# Patient Record
Sex: FEMALE | Race: WHITE | NOT HISPANIC OR LATINO | Employment: FULL TIME | URBAN - METROPOLITAN AREA
[De-identification: names, ages, dates, MRNs, and addresses within clinical notes are randomized per-mention and may not be internally consistent; named-entity substitution may affect disease eponyms.]

---

## 2019-04-26 ENCOUNTER — HOSPITAL ENCOUNTER (OUTPATIENT)
Dept: OTHER | Facility: HOSPITAL | Age: 44
Discharge: HOME | End: 2019-04-26

## 2021-02-25 ENCOUNTER — HOSPITAL ENCOUNTER (OUTPATIENT)
Dept: OTHER | Facility: HOSPITAL | Age: 46
Discharge: HOME | End: 2021-02-25

## 2023-01-19 ENCOUNTER — OFFICE VISIT (OUTPATIENT)
Dept: FAMILY MEDICINE CLINIC | Facility: CLINIC | Age: 48
End: 2023-01-19
Payer: COMMERCIAL

## 2023-01-19 VITALS
HEIGHT: 63 IN | OXYGEN SATURATION: 100 % | BODY MASS INDEX: 35.08 KG/M2 | HEART RATE: 99 BPM | DIASTOLIC BLOOD PRESSURE: 70 MMHG | WEIGHT: 198 LBS | SYSTOLIC BLOOD PRESSURE: 136 MMHG

## 2023-01-19 DIAGNOSIS — Z11.59 ENCOUNTER FOR HEPATITIS C SCREENING TEST FOR LOW RISK PATIENT: ICD-10-CM

## 2023-01-19 DIAGNOSIS — F33.1 MAJOR DEPRESSIVE DISORDER, RECURRENT, MODERATE: ICD-10-CM

## 2023-01-19 DIAGNOSIS — Z13.220 LIPID SCREENING: ICD-10-CM

## 2023-01-19 DIAGNOSIS — I10 ESSENTIAL HYPERTENSION: Primary | ICD-10-CM

## 2023-01-19 PROCEDURE — 99214 OFFICE O/P EST MOD 30 MIN: CPT | Performed by: PHYSICIAN ASSISTANT

## 2023-01-19 RX ORDER — DULOXETIN HYDROCHLORIDE 60 MG/1
60 CAPSULE, DELAYED RELEASE ORAL DAILY
Qty: 90 CAPSULE | Refills: 1 | Status: SHIPPED | OUTPATIENT
Start: 2023-01-19

## 2023-01-19 RX ORDER — OMEPRAZOLE 20 MG/1
20 CAPSULE, DELAYED RELEASE ORAL DAILY
COMMUNITY

## 2023-01-19 RX ORDER — LISINOPRIL 10 MG/1
10 TABLET ORAL DAILY
COMMUNITY
End: 2023-01-19 | Stop reason: SDUPTHER

## 2023-01-19 RX ORDER — CYCLOBENZAPRINE HCL 5 MG
5 TABLET ORAL 3 TIMES DAILY PRN
COMMUNITY

## 2023-01-19 RX ORDER — LISINOPRIL 10 MG/1
10 TABLET ORAL DAILY
Qty: 90 TABLET | Refills: 1 | Status: SHIPPED | OUTPATIENT
Start: 2023-01-19 | End: 2023-01-25 | Stop reason: DRUGHIGH

## 2023-01-19 RX ORDER — DULOXETIN HYDROCHLORIDE 60 MG/1
60 CAPSULE, DELAYED RELEASE ORAL DAILY
COMMUNITY
End: 2023-01-19 | Stop reason: SDUPTHER

## 2023-01-19 RX ORDER — CELECOXIB 200 MG/1
200 CAPSULE ORAL AS NEEDED
COMMUNITY

## 2023-01-19 RX ORDER — MELATONIN
1000 DAILY
COMMUNITY

## 2023-01-20 ENCOUNTER — CLINICAL SUPPORT (OUTPATIENT)
Dept: FAMILY MEDICINE CLINIC | Facility: CLINIC | Age: 48
End: 2023-01-20

## 2023-01-20 VITALS — DIASTOLIC BLOOD PRESSURE: 96 MMHG | SYSTOLIC BLOOD PRESSURE: 130 MMHG

## 2023-01-20 LAB
ALBUMIN SERPL-MCNC: 4.5 G/DL (ref 3.8–4.8)
ALBUMIN/GLOB SERPL: 1.9 {RATIO} (ref 1.2–2.2)
ALP SERPL-CCNC: 105 IU/L (ref 44–121)
ALT SERPL-CCNC: 19 IU/L (ref 0–32)
AST SERPL-CCNC: 18 IU/L (ref 0–40)
BASOPHILS # BLD AUTO: 0.1 X10E3/UL (ref 0–0.2)
BASOPHILS NFR BLD AUTO: 1 %
BILIRUB SERPL-MCNC: 0.2 MG/DL (ref 0–1.2)
BUN SERPL-MCNC: 12 MG/DL (ref 6–24)
BUN/CREAT SERPL: 16 (ref 9–23)
CALCIUM SERPL-MCNC: 9.7 MG/DL (ref 8.7–10.2)
CHLORIDE SERPL-SCNC: 101 MMOL/L (ref 96–106)
CHOLEST SERPL-MCNC: 179 MG/DL (ref 100–199)
CO2 SERPL-SCNC: 24 MMOL/L (ref 20–29)
CREAT SERPL-MCNC: 0.74 MG/DL (ref 0.57–1)
EGFRCR SERPLBLD CKD-EPI 2021: 100 ML/MIN/1.73
EOSINOPHIL # BLD AUTO: 0.2 X10E3/UL (ref 0–0.4)
EOSINOPHIL NFR BLD AUTO: 2 %
ERYTHROCYTE [DISTWIDTH] IN BLOOD BY AUTOMATED COUNT: 12.8 % (ref 11.7–15.4)
GLOBULIN SER CALC-MCNC: 2.4 G/DL (ref 1.5–4.5)
GLUCOSE SERPL-MCNC: 78 MG/DL (ref 70–99)
HCT VFR BLD AUTO: 42.4 % (ref 34–46.6)
HCV AB S/CO SERPL IA: <0.1 S/CO RATIO (ref 0–0.9)
HDLC SERPL-MCNC: 31 MG/DL
HGB BLD-MCNC: 13.6 G/DL (ref 11.1–15.9)
IMM GRANULOCYTES # BLD AUTO: 0 X10E3/UL (ref 0–0.1)
IMM GRANULOCYTES NFR BLD AUTO: 0 %
LDLC SERPL CALC-MCNC: 109 MG/DL (ref 0–99)
LYMPHOCYTES # BLD AUTO: 1.1 X10E3/UL (ref 0.7–3.1)
LYMPHOCYTES NFR BLD AUTO: 15 %
MCH RBC QN AUTO: 28.3 PG (ref 26.6–33)
MCHC RBC AUTO-ENTMCNC: 32.1 G/DL (ref 31.5–35.7)
MCV RBC AUTO: 88 FL (ref 79–97)
MONOCYTES # BLD AUTO: 0.6 X10E3/UL (ref 0.1–0.9)
MONOCYTES NFR BLD AUTO: 8 %
NEUTROPHILS # BLD AUTO: 5.5 X10E3/UL (ref 1.4–7)
NEUTROPHILS NFR BLD AUTO: 74 %
PLATELET # BLD AUTO: 364 X10E3/UL (ref 150–450)
POTASSIUM SERPL-SCNC: 4.8 MMOL/L (ref 3.5–5.2)
PROT SERPL-MCNC: 6.9 G/DL (ref 6–8.5)
RBC # BLD AUTO: 4.81 X10E6/UL (ref 3.77–5.28)
SODIUM SERPL-SCNC: 139 MMOL/L (ref 134–144)
TRIGL SERPL-MCNC: 223 MG/DL (ref 0–149)
TSH SERPL DL<=0.005 MIU/L-ACNC: 1.82 UIU/ML (ref 0.45–4.5)
VLDLC SERPL CALC-MCNC: 39 MG/DL (ref 5–40)
WBC # BLD AUTO: 7.4 X10E3/UL (ref 3.4–10.8)

## 2023-01-25 ENCOUNTER — TELEPHONE (OUTPATIENT)
Dept: FAMILY MEDICINE CLINIC | Facility: CLINIC | Age: 48
End: 2023-01-25
Payer: COMMERCIAL

## 2023-01-25 RX ORDER — LISINOPRIL 20 MG/1
20 TABLET ORAL DAILY
Qty: 90 TABLET | Refills: 1 | Status: SHIPPED | OUTPATIENT
Start: 2023-01-25

## 2023-01-25 NOTE — TELEPHONE ENCOUNTER
Called pt she is asking if you want to increase BP medication due to her BP being elevated came in Friday for BP check was 130/96 said its still been high.

## 2023-02-06 ENCOUNTER — TELEPHONE (OUTPATIENT)
Dept: FAMILY MEDICINE CLINIC | Facility: CLINIC | Age: 48
End: 2023-02-06
Payer: COMMERCIAL

## 2023-07-27 RX ORDER — LISINOPRIL 20 MG/1
TABLET ORAL
Qty: 30 TABLET | Refills: 0 | Status: SHIPPED | OUTPATIENT
Start: 2023-07-27

## 2023-08-22 ENCOUNTER — OFFICE VISIT (OUTPATIENT)
Dept: FAMILY MEDICINE CLINIC | Facility: CLINIC | Age: 48
End: 2023-08-22
Payer: COMMERCIAL

## 2023-08-22 VITALS
SYSTOLIC BLOOD PRESSURE: 126 MMHG | TEMPERATURE: 98.8 F | WEIGHT: 198 LBS | DIASTOLIC BLOOD PRESSURE: 80 MMHG | BODY MASS INDEX: 35.08 KG/M2 | HEIGHT: 63 IN | OXYGEN SATURATION: 99 % | HEART RATE: 96 BPM

## 2023-08-22 DIAGNOSIS — M54.2 CERVICALGIA: Primary | ICD-10-CM

## 2023-08-22 DIAGNOSIS — R51.9 CHRONIC NONINTRACTABLE HEADACHE, UNSPECIFIED HEADACHE TYPE: ICD-10-CM

## 2023-08-22 DIAGNOSIS — G89.29 CHRONIC NONINTRACTABLE HEADACHE, UNSPECIFIED HEADACHE TYPE: ICD-10-CM

## 2023-08-22 PROCEDURE — 99213 OFFICE O/P EST LOW 20 MIN: CPT | Performed by: PHYSICIAN ASSISTANT

## 2023-08-22 NOTE — PROGRESS NOTES
"Chief Complaint  Headache (Pt states has had a headache since Wednesday went to ER Friday. Pt states still having headaches )    Subjective          Vera Burch presents to [facility]  History of Present Illness  Patient in today for evaluation on headaches- states has been having more frequent past 6+ months- some seem like a typical migraine only lasting a day, but this most recent started 6 days ago and has stuck around. She went to ER and states had negative ct scan and labs- we have called to get records but not available during visit. She states pain seems to start at base of neck and up and over to right side of head. States does have some neck pain . Denies injury to neck. She denies any neuropathy symptoms. Denies visual changes. She is taking muscle relaxers as were felt could be tension type headache but states not really helping . She states is utd on eye exam.   Headache  Headache pattern:  Headache sometimes there, sometimes not at all  Providers seen:  None  Previous testing:  Blood work and CT  Number of ER visits for headache:  1  Did ER treatment alleviate headache symptoms?:  No    Objective   Vital Signs:   /80 (BP Location: Left arm, Patient Position: Sitting, Cuff Size: Large Adult)   Pulse 96   Temp 98.8 øF (37.1 øC)   Ht 160 cm (63\")   Wt 89.8 kg (198 lb)   SpO2 99%   BMI 35.07 kg/mý     Body mass index is 35.07 kg/mý.    Review of Systems   Constitutional:  Negative for fatigue.   HENT:  Negative for congestion and sore throat.    Respiratory:  Negative for cough and shortness of breath.    Cardiovascular:  Negative for chest pain.   Gastrointestinal:  Negative for abdominal pain, diarrhea, nausea and vomiting.   Musculoskeletal:  Positive for neck pain.   Neurological:  Positive for headache. Negative for dizziness.   Psychiatric/Behavioral:  Negative for depressed mood. The patient is not nervous/anxious.      Past History:  Medical History: " has a past medical history of Anxiety, Depressive disorder, and Hypertension.   Surgical History: has no past surgical history on file.   Family History: family history includes Other in her maternal grandmother.   Social History: reports that she has been smoking cigarettes. She has been smoking an average of .5 packs per day. She does not have any smokeless tobacco history on file. She reports that she does not drink alcohol and does not use drugs.      Current Outpatient Medications:     celecoxib (CeleBREX) 200 MG capsule, Take 1 capsule by mouth As Needed for Mild Pain., Disp: , Rfl:     cholecalciferol (VITAMIN D3) 25 MCG (1000 UT) tablet, Take 1 tablet by mouth Daily., Disp: , Rfl:     cyclobenzaprine (FLEXERIL) 5 MG tablet, Take 1 tablet by mouth 3 (Three) Times a Day As Needed for Muscle Spasms., Disp: , Rfl:     DULoxetine (CYMBALTA) 60 MG capsule, Take 1 capsule by mouth Daily., Disp: 90 capsule, Rfl: 1    lisinopril (PRINIVIL,ZESTRIL) 20 MG tablet, TAKE ONE TABLET BY MOUTH DAILY, Disp: 30 tablet, Rfl: 0    omeprazole (priLOSEC) 20 MG capsule, Take 1 capsule by mouth Daily., Disp: , Rfl:   Allergies: Patient has no known allergies.    Physical Exam  Constitutional:       Appearance: Normal appearance.   HENT:      Right Ear: Tympanic membrane normal.      Left Ear: Tympanic membrane normal.      Mouth/Throat:      Pharynx: Oropharynx is clear.   Eyes:      Conjunctiva/sclera: Conjunctivae normal.      Pupils: Pupils are equal, round, and reactive to light.   Cardiovascular:      Rate and Rhythm: Normal rate and regular rhythm.      Heart sounds: Normal heart sounds.   Pulmonary:      Effort: Pulmonary effort is normal.      Breath sounds: Normal breath sounds.   Abdominal:      Palpations: Abdomen is soft.      Tenderness: There is no abdominal tenderness.   Musculoskeletal:      Comments: Mild tenderness at base of neck to palpation; FROM of neck; mild tenderness to palpation bilateral trapezius muscles    Neurological:      Mental Status: She is oriented to person, place, and time.   Psychiatric:         Mood and Affect: Mood normal.         Behavior: Behavior normal.           Assessment and Plan   Diagnoses and all orders for this visit:    1. Cervicalgia (Primary)  -     XR Spine Cervical 2 or 3 View; Future  Will check xray of neck today for further evaluation; she may continue with muscle relaxers as needed- takes celebrex daily  2. Chronic nonintractable headache, unspecified headache type  She would like to get in for neurology consult with chronic headaches; rtc or to ER for any acute or worsening symptoms if needed           Follow Up   No follow-ups on file.  Patient was given instructions and counseling regarding her condition or for health maintenance advice. Please see specific information pulled into the AVS if appropriate.     Marisa Machado PA-C

## 2023-08-31 ENCOUNTER — OFFICE VISIT (OUTPATIENT)
Dept: NEUROLOGY | Facility: CLINIC | Age: 48
End: 2023-08-31
Payer: COMMERCIAL

## 2023-08-31 VITALS
HEART RATE: 96 BPM | SYSTOLIC BLOOD PRESSURE: 122 MMHG | DIASTOLIC BLOOD PRESSURE: 78 MMHG | WEIGHT: 194.6 LBS | BODY MASS INDEX: 34.48 KG/M2 | OXYGEN SATURATION: 97 % | HEIGHT: 63 IN

## 2023-08-31 DIAGNOSIS — G43.109 MIGRAINE WITH AURA AND WITHOUT STATUS MIGRAINOSUS, NOT INTRACTABLE: Primary | ICD-10-CM

## 2023-08-31 PROCEDURE — 99204 OFFICE O/P NEW MOD 45 MIN: CPT | Performed by: PSYCHIATRY & NEUROLOGY

## 2023-08-31 RX ORDER — NORTRIPTYLINE HYDROCHLORIDE 10 MG/1
20 CAPSULE ORAL NIGHTLY
Qty: 60 CAPSULE | Refills: 5 | Status: SHIPPED | OUTPATIENT
Start: 2023-08-31 | End: 2024-08-30

## 2023-08-31 RX ORDER — LISINOPRIL 20 MG/1
TABLET ORAL
Qty: 30 TABLET | Refills: 0 | Status: SHIPPED | OUTPATIENT
Start: 2023-08-31

## 2023-08-31 RX ORDER — DULOXETIN HYDROCHLORIDE 60 MG/1
CAPSULE, DELAYED RELEASE ORAL
Qty: 30 CAPSULE | Refills: 0 | Status: SHIPPED | OUTPATIENT
Start: 2023-08-31

## 2023-08-31 RX ORDER — RIZATRIPTAN BENZOATE 10 MG/1
TABLET ORAL
Qty: 8 TABLET | Refills: 3 | Status: SHIPPED | OUTPATIENT
Start: 2023-08-31

## 2023-08-31 NOTE — PROGRESS NOTES
Subjective:    CC: Vera Burch is seen today in consultation at the request of Marisa Machado PA-C for Headache (Started in June)       HPI:  48-year-old female accompanied by her  with a history of smoking, anxiety, depression, hypertension presents with headaches.  As per patient she started having headaches in June of this year.  The headaches were once every few weeks however earlier this month she had 11-day long headache on the right side of her head that would not go away with over-the-counter medications.  Therefore she went to the ER where she had a CT head that was unremarkable.  Was given an IV migraine cocktail as well as IV morphine that finally helped with the headache.  Her PCP also ordered an x-ray of the neck that was normal.  She states that she gets a dull headache almost every day that is on the front of her head on both sides most likely caused by working on a computer screen all day long.  In addition she gets a severe headache once every 2 to 3 weeks that is unilateral, throbbing, with nausea, photophobia and phonophobia preceded by visual auras where she sees spots or wavy lines in her field of vision for which she takes over-the-counter medications such as Tylenol or ibuprofen.  She also complains of constant neck pain for which she takes Flexeril at night.  She reports to having poor quality of sleep at night only getting about 4 to 5 hours of good sleep.  Snores occasionally.    The following portions of the patient's history were reviewed today and updated as of 08/31/2023  : allergies, current medications, past family history, past medical history, past social history, past surgical history, and problem list  These document will be scanned to patient's chart.      Current Outpatient Medications:     celecoxib (CeleBREX) 200 MG capsule, Take 1 capsule by mouth As Needed for Mild Pain., Disp: , Rfl:     cholecalciferol (VITAMIN D3) 25 MCG (1000 UT) tablet, Take 1 tablet by  "mouth Daily., Disp: , Rfl:     cyclobenzaprine (FLEXERIL) 5 MG tablet, Take 1 tablet by mouth 3 (Three) Times a Day As Needed for Muscle Spasms., Disp: , Rfl:     DULoxetine (CYMBALTA) 60 MG capsule, Take 1 capsule by mouth Daily., Disp: 90 capsule, Rfl: 1    lisinopril (PRINIVIL,ZESTRIL) 20 MG tablet, TAKE ONE TABLET BY MOUTH DAILY, Disp: 30 tablet, Rfl: 0    omeprazole (priLOSEC) 20 MG capsule, Take 1 capsule by mouth Daily., Disp: , Rfl:     nortriptyline (Pamelor) 10 MG capsule, Take 2 capsules by mouth Every Night., Disp: 60 capsule, Rfl: 5    rizatriptan (Maxalt) 10 MG tablet, Take 1 tablet at onset of headache.  May repeat once in 2 hours.  Do not take more than 2 tabs a day or 8 tabs a month, Disp: 8 tablet, Rfl: 3   Past Medical History:   Diagnosis Date    Anxiety     Depressive disorder     Hypertension       History reviewed. No pertinent surgical history.   Family History   Problem Relation Age of Onset    Other Maternal Grandmother         BLOOD CLOTS      Social History     Socioeconomic History    Marital status:    Tobacco Use    Smoking status: Every Day     Packs/day: 0.50     Types: Cigarettes    Tobacco comments:     HEAVY   Vaping Use    Vaping Use: Never used   Substance and Sexual Activity    Alcohol use: Never    Drug use: Never    Sexual activity: Defer     Review of Systems   Musculoskeletal:  Positive for neck pain.   Neurological:  Positive for headache.   All other systems reviewed and are negative.    Objective:    /78   Pulse 96   Ht 160 cm (63\")   Wt 88.3 kg (194 lb 9.6 oz)   SpO2 97%   BMI 34.47 kg/mý     Neurology Exam:    General apperance: NAD.     Mental status: Alert, awake and oriented to time place and person.    Recent and Remote memory: Intact.    Attention span and Concentration: Normal.     Language and Speech: Intact- No dysarthria.    Fluency, Naming , Repitition and Comprehension:  Intact    Cranial Nerves:   CN II: Visual fields are full. Intact. " Fundi - Normal, No papillederma, Pupils - BIBIANA  CN III, IV and VI: Extraocular movements are intact. Normal saccades.   CN V: Facial sensation is intact.   CN VII: Muscles of facial expression reveal no asymmetry. Intact.   CN VIII: Hearing is intact. Whispered voice intact.   CN IX and X: Palate elevates symmetrically. Intact  CN XI: Shoulder shrug is intact.   CN XII: Tongue is midline without evidence of atrophy or fasciculation.     Ophthalmoscopic exam of optic disc-normal    Motor:  Right UE muscle strength 5/5. Normal tone.     Left UE muscle strength 5/5. Normal tone.      Right LE muscle strength5/5. Normal tone.     Left LE muscle strength 5/5. Normal tone.      Sensory: Normal light touch, vibration and pinprick sensation bilaterally.    DTRs: 3+ bilaterally in upper and lower extremities.    Babinski: Negative bilaterally.    Co-ordination: Normal finger-to-nose, heel to shin B/L.    Rhomberg: Negative.    Gait: Normal.    Cardiovascular: Regular rate and rhythm without murmur, gallop or rub.    Assessment and Plan:  1. Migraine with aura and without status migrainosus, not intractable  Patient has a combination of migraine and tension type headaches  I will start her on nortriptyline for headache prophylaxis gradually increasing to 20 mg nightly  For abortive treatment of her headaches she can take Maxalt 10 mg as needed.  I told her to avoid over-the-counter medications  Counseling also given to keep her self well-hydrated, using bluelight blocking filters for her computer screen, taking magnesium supplements and doing stretching exercises for the neck       Return in about 3 months (around 11/30/2023).     I spent over 40 minutes with the patient face to face out of which over 50% (30 minutes) was spent in management, instructions and education.     Roselyn Mcduffie MD

## 2023-09-11 RX ORDER — LISINOPRIL 20 MG/1
20 TABLET ORAL DAILY
Qty: 30 TABLET | Refills: 0 | OUTPATIENT
Start: 2023-09-11

## 2023-09-11 RX ORDER — DULOXETIN HYDROCHLORIDE 60 MG/1
60 CAPSULE, DELAYED RELEASE ORAL DAILY
Qty: 30 CAPSULE | Refills: 0 | OUTPATIENT
Start: 2023-09-11

## 2023-09-18 RX ORDER — DULOXETIN HYDROCHLORIDE 60 MG/1
CAPSULE, DELAYED RELEASE ORAL
Qty: 30 CAPSULE | Refills: 1 | Status: SHIPPED | OUTPATIENT
Start: 2023-09-18

## 2023-09-27 RX ORDER — LISINOPRIL 20 MG/1
TABLET ORAL
Qty: 30 TABLET | Refills: 0 | Status: SHIPPED | OUTPATIENT
Start: 2023-09-27

## 2023-09-27 NOTE — TELEPHONE ENCOUNTER
HUB TO READ: left detailed VM for pt to call back and schedule appt with Angela. Med refill sent in for 1 month.

## 2023-09-27 NOTE — TELEPHONE ENCOUNTER
LAST OFFICE VISIT - THIS DEPT  8/22/2023 Marisa Machado, MISHA    No upcoming appointments scheduled at this time.

## 2023-10-02 NOTE — TELEPHONE ENCOUNTER
HUB TO READ: called pt, she answered but was not able to hear me, needs to schedule appt for med recheck.

## 2023-10-12 ENCOUNTER — OFFICE VISIT (OUTPATIENT)
Dept: FAMILY MEDICINE CLINIC | Facility: CLINIC | Age: 48
End: 2023-10-12
Payer: COMMERCIAL

## 2023-10-12 VITALS
WEIGHT: 197 LBS | BODY MASS INDEX: 34.91 KG/M2 | HEIGHT: 63 IN | OXYGEN SATURATION: 98 % | HEART RATE: 68 BPM | SYSTOLIC BLOOD PRESSURE: 122 MMHG | DIASTOLIC BLOOD PRESSURE: 80 MMHG

## 2023-10-12 DIAGNOSIS — F33.1 MAJOR DEPRESSIVE DISORDER, RECURRENT, MODERATE: ICD-10-CM

## 2023-10-12 DIAGNOSIS — I10 ESSENTIAL HYPERTENSION: Primary | ICD-10-CM

## 2023-10-12 LAB
BILIRUB BLD-MCNC: NORMAL MG/DL
CLARITY, POC: NORMAL
COLOR UR: NORMAL
GLUCOSE UR STRIP-MCNC: NORMAL MG/DL
KETONES UR QL: NORMAL
LEUKOCYTE EST, POC: NORMAL
NITRITE UR-MCNC: NORMAL MG/ML
PH UR: NORMAL [PH]
PROT UR STRIP-MCNC: NORMAL MG/DL
RBC # UR STRIP: NORMAL /UL
SP GR UR: NORMAL
UROBILINOGEN UR QL: NORMAL

## 2023-10-12 PROCEDURE — 99214 OFFICE O/P EST MOD 30 MIN: CPT | Performed by: PHYSICIAN ASSISTANT

## 2023-10-12 PROCEDURE — 81002 URINALYSIS NONAUTO W/O SCOPE: CPT | Performed by: PHYSICIAN ASSISTANT

## 2023-10-12 RX ORDER — DULOXETIN HYDROCHLORIDE 60 MG/1
60 CAPSULE, DELAYED RELEASE ORAL DAILY
Qty: 90 CAPSULE | Refills: 1 | Status: SHIPPED | OUTPATIENT
Start: 2023-10-12

## 2023-10-12 RX ORDER — LISINOPRIL 20 MG/1
20 TABLET ORAL DAILY
Qty: 90 TABLET | Refills: 1 | Status: SHIPPED | OUTPATIENT
Start: 2023-10-12

## 2023-10-12 NOTE — PROGRESS NOTES
"Chief Complaint  Hypertension (Med refills check up )    Subjective          Vera Burch presents to [facility]  History of Present Illness  Patient in today for medication recheck and refills. States her blood pressure has been doing well. Denies chest pain or shortness of breath. She would like to check fasting labs on next visit. She is due for gyn exam and colonoscopy- declines those today- states she will call to get in with gyn.- has IUD.  She is utd on mammogram. She states the duloxetine continues to work well for her depression symptoms and would like to continue at this time on current dosage. Denies side effects of medication.   Hypertension  This is a chronic problem. Pertinent negatives include no blurred vision, chest pain, headaches, palpitations, peripheral edema or shortness of breath. Current antihypertension treatment includes ACE inhibitors.   Depression  Visit Type: follow-up  Patient presents with the following symptoms: depressed mood.  Patient is not experiencing: chest pain, irritability, nervousness/anxiety, palpitations, shortness of breath and suicidal ideas.          Objective   Vital Signs:   /80   Pulse 68   Ht 160 cm (63\")   Wt 89.4 kg (197 lb)   SpO2 98%   BMI 34.90 kg/mý     Body mass index is 34.9 kg/mý.    Review of Systems   Constitutional:  Negative for fatigue, fever and irritability.   HENT:  Negative for congestion and sore throat.    Eyes:  Negative for blurred vision.   Respiratory:  Negative for cough and shortness of breath.    Cardiovascular:  Negative for chest pain and palpitations.   Gastrointestinal:  Negative for abdominal pain, blood in stool, diarrhea, nausea and vomiting.   Genitourinary:  Negative for dysuria and frequency.   Neurological:  Negative for headache.   Psychiatric/Behavioral:  Positive for depressed mood. Negative for suicidal ideas. The patient is not nervous/anxious.        Past History:  Medical " History: has a past medical history of Anxiety, Depressive disorder, and Hypertension.   Surgical History: has no past surgical history on file.   Family History: family history includes Other in her maternal grandmother.   Social History: reports that she has been smoking cigarettes. She has been smoking an average of .5 packs per day. She does not have any smokeless tobacco history on file. She reports that she does not drink alcohol and does not use drugs.      Current Outpatient Medications:     celecoxib (CeleBREX) 200 MG capsule, Take 1 capsule by mouth As Needed for Mild Pain., Disp: , Rfl:     cholecalciferol (VITAMIN D3) 25 MCG (1000 UT) tablet, Take 1 tablet by mouth Daily., Disp: , Rfl:     cyclobenzaprine (FLEXERIL) 5 MG tablet, Take 1 tablet by mouth 3 (Three) Times a Day As Needed for Muscle Spasms., Disp: , Rfl:     DULoxetine (CYMBALTA) 60 MG capsule, Take 1 capsule by mouth Daily., Disp: 90 capsule, Rfl: 1    lisinopril (PRINIVIL,ZESTRIL) 20 MG tablet, Take 1 tablet by mouth Daily., Disp: 90 tablet, Rfl: 1    nortriptyline (Pamelor) 10 MG capsule, Take 2 capsules by mouth Every Night., Disp: 60 capsule, Rfl: 5    omeprazole (priLOSEC) 20 MG capsule, Take 1 capsule by mouth Daily., Disp: , Rfl:     rizatriptan (Maxalt) 10 MG tablet, Take 1 tablet at onset of headache.  May repeat once in 2 hours.  Do not take more than 2 tabs a day or 8 tabs a month, Disp: 8 tablet, Rfl: 3  Allergies: Patient has no known allergies.    Physical Exam  Constitutional:       Appearance: Normal appearance.   HENT:      Right Ear: Tympanic membrane normal.      Left Ear: Tympanic membrane normal.      Mouth/Throat:      Pharynx: Oropharynx is clear.   Eyes:      Conjunctiva/sclera: Conjunctivae normal.      Pupils: Pupils are equal, round, and reactive to light.   Cardiovascular:      Rate and Rhythm: Normal rate and regular rhythm.      Heart sounds: Normal heart sounds.   Pulmonary:      Effort: Pulmonary effort is  normal.      Breath sounds: Normal breath sounds.   Abdominal:      Palpations: Abdomen is soft.      Tenderness: There is no abdominal tenderness.   Neurological:      Mental Status: She is oriented to person, place, and time.   Psychiatric:         Mood and Affect: Mood normal.         Behavior: Behavior normal.             Assessment and Plan   Diagnoses and all orders for this visit:    1. Essential hypertension (Primary)  -     Cancel: Urine Culture - Urine, Urine, Clean Catch  -     POC Urinalysis Dipstick  Will continue on current dosage of lisinopril. Encouraged healthy diet and exercise. Fasting labs at next appointment. Encouraged to get in for gyn exam and colonoscopy.   2. Major depressive disorder, recurrent, moderate  Will continue current dosage of cymbalta at this time. RTC prior to recheck as needed.   Other orders  -     DULoxetine (CYMBALTA) 60 MG capsule; Take 1 capsule by mouth Daily.  Dispense: 90 capsule; Refill: 1  -     lisinopril (PRINIVIL,ZESTRIL) 20 MG tablet; Take 1 tablet by mouth Daily.  Dispense: 90 tablet; Refill: 1            Follow Up   No follow-ups on file.  Patient was given instructions and counseling regarding her condition or for health maintenance advice. Please see specific information pulled into the AVS if appropriate.     Marisa Machado PA-C

## 2024-01-29 ENCOUNTER — OFFICE VISIT (OUTPATIENT)
Dept: NEUROLOGY | Facility: CLINIC | Age: 49
End: 2024-01-29
Payer: COMMERCIAL

## 2024-01-29 VITALS
BODY MASS INDEX: 32.96 KG/M2 | SYSTOLIC BLOOD PRESSURE: 122 MMHG | WEIGHT: 186 LBS | DIASTOLIC BLOOD PRESSURE: 82 MMHG | OXYGEN SATURATION: 98 % | HEART RATE: 97 BPM | HEIGHT: 63 IN

## 2024-01-29 DIAGNOSIS — G43.109 MIGRAINE WITH AURA AND WITHOUT STATUS MIGRAINOSUS, NOT INTRACTABLE: Primary | ICD-10-CM

## 2024-01-29 PROCEDURE — 99213 OFFICE O/P EST LOW 20 MIN: CPT | Performed by: PSYCHIATRY & NEUROLOGY

## 2024-01-29 RX ORDER — RIZATRIPTAN BENZOATE 10 MG/1
TABLET ORAL
Qty: 8 TABLET | Refills: 3 | Status: SHIPPED | OUTPATIENT
Start: 2024-01-29

## 2024-01-29 RX ORDER — NORTRIPTYLINE HYDROCHLORIDE 25 MG/1
25 CAPSULE ORAL NIGHTLY
Qty: 30 CAPSULE | Refills: 11 | Status: SHIPPED | OUTPATIENT
Start: 2024-01-29 | End: 2025-01-28

## 2024-01-29 NOTE — PROGRESS NOTES
Subjective:    CC: Vera Burch is seen today for Migraine       Current visit-patient states that since starting nortriptyline now at 20 mg nightly her headache frequency has improved tremendously.  She has only had 1 severe migraine headaches since she last saw me in August and a few dull frontal ones.  Her sleep has also improved.  She did try Maxalt as needed which helped.  Has been having hot flashes and she is wondering if it could be due to nortriptyline.    Initial [id number]-onyc-old female accompanied by her  with a history of smoking, anxiety, depression, hypertension presents with headaches.  As per patient she started having headaches in June of this year.  The headaches were once every few weeks however earlier this month she had 11-day long headache on the right side of her head that would not go away with over-the-counter medications.  Therefore she went to the ER where she had a CT head that was unremarkable.  Was given an IV migraine cocktail as well as IV morphine that finally helped with the headache.  Her PCP also ordered an x-ray of the neck that was normal.  She states that she gets a dull headache almost every day that is on the front of her head on both sides most likely caused by working on a computer screen all day long.  In addition she gets a severe headache once every 2 to 3 weeks that is unilateral, throbbing, with nausea, photophobia and phonophobia preceded by visual auras where she sees spots or wavy lines in her field of vision for which she takes over-the-counter medications such as Tylenol or ibuprofen.  She also complains of constant neck pain for which she takes Flexeril at night.  She reports to having poor quality of sleep at night only getting about 4 to 5 hours of good sleep.  Snores occasionally.    The following portions of the patient's history were reviewed today and updated as of 08/31/2023  : allergies, current medications, past family history, past medical  history, past social history, past surgical history, and problem list  These document will be scanned to patient's chart.      Current Outpatient Medications:     celecoxib (CeleBREX) 200 MG capsule, Take 1 capsule by mouth As Needed for Mild Pain., Disp: , Rfl:     cholecalciferol (VITAMIN D3) 25 MCG (1000 UT) tablet, Take 1 tablet by mouth Daily., Disp: , Rfl:     cyclobenzaprine (FLEXERIL) 5 MG tablet, Take 1 tablet by mouth 3 (Three) Times a Day As Needed for Muscle Spasms., Disp: , Rfl:     DULoxetine (CYMBALTA) 60 MG capsule, Take 1 capsule by mouth Daily., Disp: 90 capsule, Rfl: 1    lisinopril (PRINIVIL,ZESTRIL) 20 MG tablet, Take 1 tablet by mouth Daily., Disp: 90 tablet, Rfl: 1    omeprazole (priLOSEC) 20 MG capsule, Take 1 capsule by mouth Daily., Disp: , Rfl:     rizatriptan (Maxalt) 10 MG tablet, Take 1 tablet at onset of headache.  May repeat once in 2 hours.  Do not take more than 2 tabs a day or 8 tabs a month, Disp: 8 tablet, Rfl: 3    nortriptyline (Pamelor) 25 MG capsule, Take 1 capsule by mouth Every Night., Disp: 30 capsule, Rfl: 11   Past Medical History:   Diagnosis Date    Anxiety     CTS (carpal tunnel syndrome) 2001    With pregnancy    Depressive disorder     Headache, tension-type 2021    Hyperlipidemia 01/2023    Not on medicine    Hypertension     Memory loss 2022    Slight- hard time remembering basic things    Migraine 06/23      History reviewed. No pertinent surgical history.   Family History   Problem Relation Age of Onset    Other Maternal Grandmother         BLOOD CLOTS    Neuropathy Mother       Social History     Socioeconomic History    Marital status:    Tobacco Use    Smoking status: Every Day     Packs/day: 0.25     Years: 3.00     Additional pack years: 0.00     Total pack years: 0.75     Types: Cigarettes    Tobacco comments:     HEAVY   Vaping Use    Vaping Use: Never used   Substance and Sexual Activity    Alcohol use: Never    Drug use: Never    Sexual activity:  "Yes     Partners: Male     Birth control/protection: I.U.D.     Review of Systems   Musculoskeletal:  Positive for neck pain.   Neurological:  Positive for headache.   All other systems reviewed and are negative.      Objective:    /82   Pulse 97   Ht 160 cm (63\")   Wt 84.4 kg (186 lb)   SpO2 98%   BMI 32.95 kg/m²     Neurology Exam:    General apperance: NAD.     Mental status: Alert, awake and oriented to time place and person.    Recent and Remote memory: Intact.    Attention span and Concentration: Normal.     Language and Speech: Intact- No dysarthria.    Fluency, Naming , Repitition and Comprehension:  Intact    Cranial Nerves:   CN II: Visual fields are full. Intact. Fundi - Normal, No papillederma, Pupils - BIBIANA  CN III, IV and VI: Extraocular movements are intact. Normal saccades.   CN V: Facial sensation is intact.   CN VII: Muscles of facial expression reveal no asymmetry. Intact.   CN VIII: Hearing is intact. Whispered voice intact.   CN IX and X: Palate elevates symmetrically. Intact  CN XI: Shoulder shrug is intact.   CN XII: Tongue is midline without evidence of atrophy or fasciculation.     Ophthalmoscopic exam of optic disc-normal    Motor:  Right UE muscle strength 5/5. Normal tone.     Left UE muscle strength 5/5. Normal tone.      Right LE muscle strength5/5. Normal tone.     Left LE muscle strength 5/5. Normal tone.      Sensory: Normal light touch, vibration and pinprick sensation bilaterally.    DTRs: 3+ bilaterally in upper and lower extremities.    Babinski: Negative bilaterally.    Co-ordination: Normal finger-to-nose, heel to shin B/L.    Rhomberg: Negative.    Gait: Normal.    Cardiovascular: Regular rate and rhythm without murmur, gallop or rub.    Assessment and Plan:  1. Migraine with aura and without status migrainosus, not intractable  Patient has a combination of migraine and tension type headaches  I have told her to increase her nortriptyline to 25 mg nightly  For " abortive treatment of her headaches she can continue taking Maxalt 10 mg as needed.    I feel that her hot flashes could be due to hormonal changes/her being perimenopausal and not due to the nortriptyline.       Return in about 9 months (around 10/29/2024).         Roselyn Mcduffie MD

## 2024-02-07 ENCOUNTER — TELEPHONE (OUTPATIENT)
Dept: OBSTETRICS AND GYNECOLOGY | Age: 49
End: 2024-02-07
Payer: COMMERCIAL

## 2024-02-07 ENCOUNTER — OFFICE VISIT (OUTPATIENT)
Dept: OBSTETRICS AND GYNECOLOGY | Age: 49
End: 2024-02-07
Payer: COMMERCIAL

## 2024-02-07 VITALS
DIASTOLIC BLOOD PRESSURE: 74 MMHG | HEIGHT: 63 IN | SYSTOLIC BLOOD PRESSURE: 112 MMHG | BODY MASS INDEX: 32.6 KG/M2 | WEIGHT: 184 LBS

## 2024-02-07 DIAGNOSIS — R23.2 HOT FLASHES: ICD-10-CM

## 2024-02-07 DIAGNOSIS — Z12.4 SCREENING FOR MALIGNANT NEOPLASM OF CERVIX: ICD-10-CM

## 2024-02-07 DIAGNOSIS — B37.2 YEAST DERMATITIS: ICD-10-CM

## 2024-02-07 DIAGNOSIS — Z97.5 IUD (INTRAUTERINE DEVICE) IN PLACE: ICD-10-CM

## 2024-02-07 DIAGNOSIS — Z01.419 WELL FEMALE EXAM WITH ROUTINE GYNECOLOGICAL EXAM: Primary | ICD-10-CM

## 2024-02-07 DIAGNOSIS — Z12.31 SCREENING MAMMOGRAM FOR BREAST CANCER: ICD-10-CM

## 2024-02-07 DIAGNOSIS — Z11.51 SCREENING FOR HUMAN PAPILLOMAVIRUS (HPV): ICD-10-CM

## 2024-02-07 RX ORDER — LEVONORGESTREL 52 MG/1
1 INTRAUTERINE DEVICE INTRAUTERINE
COMMUNITY

## 2024-02-07 RX ORDER — NYSTATIN 100000 U/G
1 CREAM TOPICAL 2 TIMES DAILY
Qty: 15 G | Refills: 1 | Status: SHIPPED | OUTPATIENT
Start: 2024-02-07

## 2024-02-07 NOTE — PROGRESS NOTES
Subjective     History of Present Illness    Chief Complaint   Patient presents with    Gynecologic Exam     Cc: new patient here today for annual visit and establish care   Pap 7/10/2018 neg , no hx of abnormal paps    Current contraception mirena inserted  7/10/2018 no periods sometime light spotting no problems   Mammogram 2022 @ [facility] would like to continue having them in [place]   Colonoscopy never done   No Problems today   Having some hot flashes   No urinary issues today        Vera Burch is a 48 y.o. female who presents for annual exam.  New patient, here to establish care. No vaginal concerns or urinary symptoms. Declines STD testing.   C/o hot flashes x 3-4 weeks ago. It occurs a couple times a week, during the day.  She reports that she recently had to leave a restaurant because she was drenched in sweat.  C/o Breast rash and itching since 2018. States it occurs under her breast when it's hot out and she is holding sweat.  Reports she used to have a prescription for a cream to apply.   IUD - Mirena, inserted . No complaints, menses are light spotting.  Colon cancer screening - patient has not had colonoscopy. Declines referral for colonoscopy or order for Cologuard.  Mammogram - pt reports normal in , requests orders to [facility]   PAP - pt reports normal/negative in 2018, no hx abnormal   Neurology - takes Notriptyline for migraines  Follows with PCP - completes wellness labs with them  Social - works at [employer] in [place]    Obstetric History:  OB History          4    Para   2    Term   2            AB   2    Living   2         SAB   2    IAB        Ectopic        Molar        Multiple        Live Births   2               Menstrual History:     No LMP recorded (lmp unknown). (Menstrual status: Other).           Current contraception: IUD  History of abnormal Pap smear: no  Received Gardasil immunization: no  Perform regular self breast exam:  "no  Family history of uterine or ovarian cancer: no  Family History of colon cancer: no  Family history of breast cancer: yes - paternal aunt    Mammogram: ordered.  Colonoscopy: recommended.  DEXA: not indicated.    Exercise: moderately active - walking   Calcium/Vitamin D: adequate intake and uses supplements    The following portions of the patient's history were reviewed and updated as appropriate: allergies, current medications, past family history, past medical history, past social history, past surgical history, and problem list.    Review of Systems  Pertinent items are noted in HPI.       Objective   Physical Exam    /74   Ht 160 cm (63\")   Wt 83.5 kg (184 lb)   LMP  (LMP Unknown)   BMI 32.59 kg/m²      General: alert, appears stated age, cooperative, and no distress   Heart: regular rate and rhythm, S1, S2 normal, no murmur, click, rub or gallop   Lungs: clear to auscultation bilaterally   Abdomen: soft, non-tender, without masses or organomegaly   Breast: inspection negative, no nipple discharge or bleeding, no masses or nodularity palpable and redness under breast   External genitalia/Vulva: External genitalia including bartholin's glands, Urethra, Menands's gland and urethra meatus are normal and Bladder appears normal without significant prolapse    Vagina: normal mucosa, normal discharge, scant blood   Cervix: no lesions and IUD strings are visible   Uterus: normal size and non-tender   Adnexa: normal adnexa and no mass, fullness, tenderness   Neurologic: Alert and Oriented x3   Psychiatric: Normal affect, judgement, and mood       Assessment   Diagnoses and all orders for this visit:    1. Well female exam with routine gynecological exam (Primary)  -     IGP, Apt HPV,rfx 16 / 18,45    2. Screening for human papillomavirus (HPV)  -     IGP, Apt HPV,rfx 16 / 18,45    3. Screening for malignant neoplasm of cervix  -     IGP, Apt HPV,rfx 16 / 18,45    4. Screening mammogram for breast cancer  -   "   Mammo Screening Digital Tomosynthesis Bilateral With CAD; Future    5. Hot flashes  -     FSH & LH  -     Estradiol  -     TSH+Free T4    6. Yeast dermatitis  -     nystatin (MYCOSTATIN) 567208 UNIT/GM cream; Apply 1 Application topically to the appropriate area as directed 2 (Two) Times a Day.  Dispense: 15 g; Refill: 1    7. IUD (intrauterine device) in place            Plan   -PAP done today, will call pt with results  -FSH, LH, and estradiol labs completed today, will call patient with results  -Nystatin cream sent to pharmacy for relief of yeast dermatitis under breast, advised patient to use as needed  -Mammogram order placed for [facility], per patient request  -IUD in place. If no problems or complaints arise, it does not need to be replaced until 2026.  -Colon cancer screening: Reviewed recommendations for screening with patient,  Patient declined referral for colonoscopy or Cologuard order.      As part of wellness and prevention, the following topics were discussed with the patient:  Encouraged self breast exam  Physical activity and regular exercised encouraged.   Nutrition discussed.  Contraception discussed.   Encouraged vitamin D 600IU supplement and 1200mg calcium supplement over the counter to prevent osteoporosis.    All questions answered.   Advised patient to contact me if any questions or concerns arise before their next annual appointment.  Return in 1 year (on 2/7/2025) for Annual exam.

## 2024-02-08 ENCOUNTER — PATIENT ROUNDING (BHMG ONLY) (OUTPATIENT)
Dept: OBSTETRICS AND GYNECOLOGY | Age: 49
End: 2024-02-08
Payer: COMMERCIAL

## 2024-02-08 LAB
ESTRADIOL SERPL-MCNC: 41.4 PG/ML
FSH SERPL-ACNC: 12.8 MIU/ML
LH SERPL-ACNC: 10 MIU/ML
T4 FREE SERPL-MCNC: 1.03 NG/DL (ref 0.82–1.77)
TSH SERPL DL<=0.005 MIU/L-ACNC: 1.55 UIU/ML (ref 0.45–4.5)

## 2024-02-10 LAB
CYTOLOGIST CVX/VAG CYTO: NORMAL
CYTOLOGY CVX/VAG DOC CYTO: NORMAL
CYTOLOGY CVX/VAG DOC THIN PREP: NORMAL
DX ICD CODE: NORMAL
HIV 1 & 2 AB SER-IMP: NORMAL
HPV I/H RISK 4 DNA CVX QL PROBE+SIG AMP: NEGATIVE
OTHER STN SPEC: NORMAL
STAT OF ADQ CVX/VAG CYTO-IMP: NORMAL

## 2024-02-20 ENCOUNTER — HOSPITAL ENCOUNTER (OUTPATIENT)
Dept: OTHER | Facility: HOSPITAL | Age: 49
Discharge: HOME | End: 2024-02-20

## 2024-04-29 RX ORDER — LISINOPRIL 20 MG/1
20 TABLET ORAL DAILY
Qty: 30 TABLET | Refills: 0 | Status: SHIPPED | OUTPATIENT
Start: 2024-04-29

## 2024-09-15 ENCOUNTER — PATIENT MESSAGE (OUTPATIENT)
Dept: OBSTETRICS AND GYNECOLOGY | Age: 49
End: 2024-09-15
Payer: COMMERCIAL

## 2024-09-16 ENCOUNTER — OFFICE VISIT (OUTPATIENT)
Dept: OBSTETRICS AND GYNECOLOGY | Age: 49
End: 2024-09-16
Payer: COMMERCIAL

## 2024-09-16 VITALS
WEIGHT: 196 LBS | BODY MASS INDEX: 34.73 KG/M2 | HEIGHT: 63 IN | SYSTOLIC BLOOD PRESSURE: 130 MMHG | DIASTOLIC BLOOD PRESSURE: 82 MMHG

## 2024-09-16 DIAGNOSIS — N63.21 MASS OF UPPER OUTER QUADRANT OF LEFT BREAST: Primary | ICD-10-CM

## 2024-09-16 PROCEDURE — 99213 OFFICE O/P EST LOW 20 MIN: CPT

## 2024-09-16 RX ORDER — FENOFIBRATE 145 MG/1
145 TABLET, COATED ORAL DAILY
COMMUNITY
Start: 2024-06-24

## 2024-09-24 ENCOUNTER — HOSPITAL ENCOUNTER (OUTPATIENT)
Dept: MAMMOGRAPHY | Facility: HOSPITAL | Age: 49
Discharge: HOME | End: 2024-09-24
Payer: COMMERCIAL

## 2024-09-24 ENCOUNTER — HOSPITAL ENCOUNTER (OUTPATIENT)
Dept: ULTRASOUND IMAGING | Facility: HOSPITAL | Age: 49
Discharge: HOME | End: 2024-09-24
Payer: COMMERCIAL

## 2024-09-24 DIAGNOSIS — N63.21 MASS OF UPPER OUTER QUADRANT OF LEFT BREAST: Primary | ICD-10-CM

## 2024-09-24 DIAGNOSIS — Z09 FOLLOW-UP EXAM, 3-6 MONTHS SINCE PREVIOUS EXAM: ICD-10-CM

## 2024-09-24 PROCEDURE — G0279 TOMOSYNTHESIS, MAMMO: HCPCS

## 2024-09-24 PROCEDURE — 77065 DX MAMMO INCL CAD UNI: CPT

## 2024-09-24 PROCEDURE — 76642 ULTRASOUND BREAST LIMITED: CPT

## 2025-01-22 PROBLEM — M25.50 ARTHRALGIA: Status: ACTIVE | Noted: 2025-01-22

## 2025-01-22 PROBLEM — R53.83 FATIGUE: Status: ACTIVE | Noted: 2025-01-22

## 2025-01-22 PROBLEM — R76.8 POSITIVE ANA (ANTINUCLEAR ANTIBODY): Status: ACTIVE | Noted: 2025-01-22

## 2025-03-26 ENCOUNTER — HOSPITAL ENCOUNTER (OUTPATIENT)
Dept: MAMMOGRAPHY | Facility: HOSPITAL | Age: 50
Discharge: HOME | End: 2025-03-26
Payer: COMMERCIAL

## 2025-03-26 ENCOUNTER — HOSPITAL ENCOUNTER (OUTPATIENT)
Dept: ULTRASOUND IMAGING | Facility: HOSPITAL | Age: 50
Discharge: HOME | End: 2025-03-26
Payer: COMMERCIAL

## 2025-03-26 DIAGNOSIS — Z09 FOLLOW-UP EXAM, 3-6 MONTHS SINCE PREVIOUS EXAM: ICD-10-CM

## 2025-03-26 DIAGNOSIS — N63.21 MASS OF UPPER OUTER QUADRANT OF LEFT BREAST: ICD-10-CM

## 2025-03-26 PROCEDURE — 76642 ULTRASOUND BREAST LIMITED: CPT

## 2025-03-26 PROCEDURE — G0279 TOMOSYNTHESIS, MAMMO: HCPCS

## 2025-03-26 PROCEDURE — 77066 DX MAMMO INCL CAD BI: CPT
